# Patient Record
Sex: FEMALE | Race: WHITE | NOT HISPANIC OR LATINO | Employment: OTHER | URBAN - METROPOLITAN AREA
[De-identification: names, ages, dates, MRNs, and addresses within clinical notes are randomized per-mention and may not be internally consistent; named-entity substitution may affect disease eponyms.]

---

## 2024-08-22 ENCOUNTER — MEDICAL CORRESPONDENCE (OUTPATIENT)
Dept: HEALTH INFORMATION MANAGEMENT | Facility: CLINIC | Age: 59
End: 2024-08-22
Payer: COMMERCIAL

## 2024-08-22 ENCOUNTER — TRANSCRIBE ORDERS (OUTPATIENT)
Dept: OTHER | Age: 59
End: 2024-08-22

## 2024-08-22 DIAGNOSIS — M19.012 OSTEOARTHRITIS OF GLENOHUMERAL JOINT, LEFT: Primary | ICD-10-CM

## 2024-09-19 NOTE — TELEPHONE ENCOUNTER
Action 9/19/2024   Action Taken 1) Called [facility] and asked images to be pushed in PACS      REASON FOR VISIT: Osteoarthritis of glenohumeral joint, left   DATE OF APPT: 9/20/2024   NOTES (FOR ALL VISITS) STATUS DETAILS   OFFICE NOTE from referring provider [facility] Nelson Cavazos MD 8/22/2024   OFFICE NOTE from other specialist [facility] Dana Gagnon APRN CNP 6/28/2024   MEDICATION LIST N/A    IMAGING  (FOR ALL VISITS)     XR [facility]  XR Shoulder left 6/15/2024   MRI (HEAD, NECK, SPINE) N/A    CT (HEAD, NECK, SPINE) N/A

## 2024-10-07 ENCOUNTER — PRE VISIT (OUTPATIENT)
Dept: ORTHOPEDICS | Facility: CLINIC | Age: 59
End: 2024-10-07

## 2025-05-05 ENCOUNTER — ANCILLARY PROCEDURE (OUTPATIENT)
Dept: CT IMAGING | Facility: CLINIC | Age: 60
End: 2025-05-05
Attending: ORTHOPAEDIC SURGERY
Payer: COMMERCIAL

## 2025-05-05 ENCOUNTER — OFFICE VISIT (OUTPATIENT)
Dept: ORTHOPEDICS | Facility: CLINIC | Age: 60
End: 2025-05-05
Payer: COMMERCIAL

## 2025-05-05 ENCOUNTER — TELEPHONE (OUTPATIENT)
Dept: ORTHOPEDICS | Facility: CLINIC | Age: 60
End: 2025-05-05

## 2025-05-05 VITALS — HEIGHT: 55 IN | BODY MASS INDEX: 28.93 KG/M2 | WEIGHT: 125 LBS

## 2025-05-05 DIAGNOSIS — M19.012 ARTHRITIS OF SHOULDER REGION, LEFT: Primary | ICD-10-CM

## 2025-05-05 DIAGNOSIS — M19.012 ARTHRITIS OF SHOULDER REGION, LEFT: ICD-10-CM

## 2025-05-05 PROCEDURE — 99204 OFFICE O/P NEW MOD 45 MIN: CPT | Mod: GC | Performed by: ORTHOPAEDIC SURGERY

## 2025-05-05 PROCEDURE — 73200 CT UPPER EXTREMITY W/O DYE: CPT | Mod: LT | Performed by: RADIOLOGY

## 2025-05-05 RX ORDER — NAPROXEN 250 MG/1
TABLET ORAL
COMMUNITY

## 2025-05-05 RX ORDER — UBROGEPANT 100 MG/1
TABLET ORAL
COMMUNITY
Start: 2024-07-11

## 2025-05-05 RX ORDER — HYDROXYZINE HYDROCHLORIDE 25 MG/1
TABLET, FILM COATED ORAL
COMMUNITY
Start: 2024-09-03

## 2025-05-05 RX ORDER — AMLODIPINE BESYLATE 5 MG/1
TABLET ORAL
COMMUNITY
Start: 2025-02-03

## 2025-05-05 RX ORDER — GABAPENTIN 300 MG/1
CAPSULE ORAL
COMMUNITY
Start: 2025-02-03

## 2025-05-05 RX ORDER — LOSARTAN POTASSIUM 100 MG/1
TABLET ORAL
COMMUNITY

## 2025-05-05 RX ORDER — CYCLOBENZAPRINE HCL 10 MG
TABLET ORAL
COMMUNITY
Start: 2025-01-20

## 2025-05-05 RX ORDER — OMEPRAZOLE 20 MG/1
20 CAPSULE, DELAYED RELEASE ORAL
COMMUNITY

## 2025-05-05 RX ORDER — FLUTICASONE PROPIONATE 50 MCG
SPRAY, SUSPENSION (ML) NASAL
COMMUNITY
Start: 2025-02-26

## 2025-05-05 NOTE — LETTER
5/5/2025      Rosalie Fischer  [address]  [place] [address]      Dear Colleague,    Thank you for referring your patient, Rosalie Fischer, to the [facility]. Please see a copy of my visit note below.    CHIEF CONCERN: Left shoulder pain    HISTORY OF PRESENT ILLNESS:    Rosalie Fischer is a RHD 59 year old female who has left shoulder pain. She notes chronic shoulder pain which is getting worse. She localizes the pain to the anterior aspect of her shoulder as well as deep inside the glenohumeral joint. Her symptoms affect her daily activities and occasionally disturbs her sleep. She does not smoke cigarettes. Past medical history significant for achondroplasia, lumbar stenosis status post L4-S1 interbody fusion, foraminal stenosis of cervical spine. She has not tried PT or corticosteroid injections. She saw Dr. Sandhu at [facility] who referred her here for further management.    No past medical history on file.    No past surgical history on file.    Current Outpatient Medications   Medication Sig Dispense Refill     amLODIPine (NORVASC) 5 MG tablet Take 1 tablet every day by oral route at bedtime.       cyclobenzaprine (FLEXERIL) 10 MG tablet Take 1 Tablet (10 mg) by mouth if needed in themorning, at noon, and before bedtime for musclespasms.       fluticasone (FLONASE) 50 MCG/ACT nasal spray Inhale 2 Sprays into each nostril in the morning.       gabapentin (NEURONTIN) 300 MG capsule Take 2 capsules every day by oral route at bedtime.       hydrOXYzine HCl (ATARAX) 25 MG tablet Take 1 Tablet (25 mg) by mouth every 6 hours ifneeded for anxiety.       UBRELVY 100 MG tablet Take 1 Tablet (100 mg) by mouth as directed. Take 1tablet at onset of headache; may repeat in 2 hours asneeded.       losartan (COZAAR) 100 MG tablet Take 1 tablet every day by oral route at bedtime.       naproxen (NAPROSYN) 250 MG tablet Take 1 Tablet (250 mg) by mouth if needed in themorning and before  bedtime (pain)       omeprazole (PRILOSEC) 20 MG DR capsule Take 20 mg by mouth.            Allergies   Allergen Reactions     Prednisone Unknown and Other (See Comments)     Irritability, jittery    prednisone       SOCIAL HISTORY:    Social History     Socioeconomic History     Marital status: Single     Spouse name: Not on file     Number of children: Not on file     Years of education: Not on file     Highest education level: Not on file   Occupational History     Not on file   Tobacco Use     Smoking status: Not on file     Smokeless tobacco: Not on file   Substance and Sexual Activity     Alcohol use: Not on file     Drug use: Not on file     Sexual activity: Not on file   Other Topics Concern     Not on file   Social History Narrative     Not on file     Social Drivers of Health     Financial Resource Strain: Low Risk  (6/7/2024)    [facility]    Overall Financial Resource Strain (CARDIA)      Difficulty of Paying Living Expenses: Not very hard   Food Insecurity: No Food Insecurity (12/9/2024)    [email or web address]    Hunger Vital Sign      Worried About Running Out of Food in the Last Year: Never true      Ran Out of Food in the Last Year: Never true   Transportation Needs: No Transportation Needs (1/21/2025)    [facility]    OASIS : Transportation      Lack of Transportation (Medical): No      Lack of Transportation (Non-Medical): No      Patient Unable or Declines to Respond: No   Recent Concern: Transportation Needs - Unmet Transportation Needs (12/9/2024)    [facility]    Transportation Needs      In the past 12 months, has lack of transportation kept you from medical appointments, meetings, work, or from getting medicines or things needed for daily living?: Yes   Physical Activity: Inactive (6/7/2024)    [facility]    Exercise  Vital Sign      Days of Exercise per Week: 0 days      Minutes of Exercise per Session: 10 min   Stress: No Stress Concern Present (6/7/2024)    Received from [facility]    [employer] - Occupational Stress Questionnaire      Feeling of Stress : Only a little   Social Connections: Feeling Socially Integrated (1/21/2025)    Received from [facility]    OASIS : Social Isolation      Frequency of experiencing loneliness or isolation: Rarely   Interpersonal Safety: Not At Risk (1/31/2025)    Received from [facility]    Intimate Partner Violence      Are you in a relationship where you are physically hurt, threatened and/or made to feel afraid?: No   Housing Stability: Low Risk  (12/9/2024)    Received from [facility]    Housing Stability Vital Sign      Unable to Pay for Housing in the Last Year: No      Number of Times Moved in the Last Year: 0      Homeless in the Last Year: No       FAMILY HISTORY: Reviewed in EMR      REVIEW OF SYSTEMS: Positive for that noted in past medical history and history of present illness and otherwise reviewed in EMR     PHYSICAL EXAM:    Adult female in no acute distress. Articulates and communicates with normal affect.  Respirations even and unlabored  Focused upper extremity exam: Skin intact. No erythema. Sensation intact all dermatomes into the hand to light touch. EPL, FPL, and Intrinsics intact. Right shoulder active motion is FE to 130, ER at side to 40, and IR to L4. Left shoulder active motion is FE to 80, ER to 10, and IR to back pocket.  Positive Neer and Rice. No pain on palpation over the AC joint. Mild pain on palpation over the long head of the biceps.       IMAGING: (6/15/2024)  IMPRESSION:   1. Severe degenerative glenohumeral joint.   2. Rotator cuff calcific tendinosis.     ASSESSMENT:    Glenohumeral osteoarthritis, Left shoulder    PLAN:  We  discussed the finding of advanced shoulder arthritis. We discussed the non-operative and operative treatment options including the risks and potential benefits of each. These include activity modifications, cortisone injections, and PT for non-operative management, and total shoulder arthroplasty vs reverse shoulder arthroplasty as the operative treatment option. We discussed the expectations for pain relief and/or motion improvement with each option. She does not feel that non-operative management thus far has adequately improved symptoms and pain is very limiting at this time. She would like to proceed with surgical treatment.   We discussed getting a CT scan of the shoulder to plan for the surgery and decide between total shoulder vs reverse shoulder arthroplasty.   Patient agrees with the plan and wants to proceed with getting CT.    Gabrielle Mar MD  Fellow    I have personally examined this patient and have reviewed the clinical presentation and progress note with the fellow.  I agree with the treatment plan as outlined.  The plan was formulated with the fellow on the day of the fellow's note.     Ivon Garcia MD      Again, thank you for allowing me to participate in the care of your patient.        Sincerely,      Ivon Garcia MD    Electronically signed

## 2025-05-05 NOTE — NURSING NOTE
"Reason For Visit:   Chief Complaint   Patient presents with    Consult For     Left shoulder pain       PCP: No Ref-Primary, Physician  Ref: Dr. Sandhu    ?  No  Occupation Retired.  Currently working? No.  Work status?  Retired.  Date of injury: Gradual onset  Type of injury: No injury.  Date of surgery: No previous shoulder surgeries; no cortisone injections  Smoker: No  Request smoking cessation information: No    Right hand dominant    SANE score  Affected shoulder: Left  Right shoulder SANE: 90  Left shoulder SANE: 80    Ht 1.27 m (4' 2\")   Wt 56.7 kg (125 lb)   BMI 35.15 kg/m      Ghislaine Elmore ATC  "

## 2025-05-05 NOTE — PROGRESS NOTES
CHIEF CONCERN: Left shoulder pain    HISTORY OF PRESENT ILLNESS:    Rosalie Fischer is a RHD 59 year old female who has left shoulder pain. She notes chronic shoulder pain which is getting worse. She localizes the pain to the anterior aspect of her shoulder as well as deep inside the glenohumeral joint. Her symptoms affect her daily activities and occasionally disturbs her sleep. She does not smoke cigarettes. Past medical history significant for achondroplasia, lumbar stenosis status post L4-S1 interbody fusion, foraminal stenosis of cervical spine. She has not tried PT or corticosteroid injections. She saw Dr. Sandhu at [facility] who referred her here for further management.    No past medical history on file.    No past surgical history on file.    Current Outpatient Medications   Medication Sig Dispense Refill    amLODIPine (NORVASC) 5 MG tablet Take 1 tablet every day by oral route at bedtime.      cyclobenzaprine (FLEXERIL) 10 MG tablet Take 1 Tablet (10 mg) by mouth if needed in themorning, at noon, and before bedtime for musclespasms.      fluticasone (FLONASE) 50 MCG/ACT nasal spray Inhale 2 Sprays into each nostril in the morning.      gabapentin (NEURONTIN) 300 MG capsule Take 2 capsules every day by oral route at bedtime.      hydrOXYzine HCl (ATARAX) 25 MG tablet Take 1 Tablet (25 mg) by mouth every 6 hours ifneeded for anxiety.      UBRELVY 100 MG tablet Take 1 Tablet (100 mg) by mouth as directed. Take 1tablet at onset of headache; may repeat in 2 hours asneeded.      losartan (COZAAR) 100 MG tablet Take 1 tablet every day by oral route at bedtime.      naproxen (NAPROSYN) 250 MG tablet Take 1 Tablet (250 mg) by mouth if needed in themorning and before bedtime (pain)      omeprazole (PRILOSEC) 20 MG DR capsule Take 20 mg by mouth.            Allergies   Allergen Reactions    Prednisone Unknown and Other (See Comments)     Irritability, jittery    prednisone       SOCIAL HISTORY:    Social History      Socioeconomic History    Marital status: Single     Spouse name: Not on file    Number of children: Not on file    Years of education: Not on file    Highest education level: Not on file   Occupational History    Not on file   Tobacco Use    Smoking status: Not on file    Smokeless tobacco: Not on file   Substance and Sexual Activity    Alcohol use: Not on file    Drug use: Not on file    Sexual activity: Not on file   Other Topics Concern    Not on file   Social History Narrative    Not on file     Social Drivers of Health     Financial Resource Strain: Low Risk  (6/7/2024)    [facility]    Overall Financial Resource Strain (CARDIA)     Difficulty of Paying Living Expenses: Not very hard   Food Insecurity: No Food Insecurity (12/9/2024)    Received from [facility]    Hunger Vital Sign     Worried About Running Out of Food in the Last Year: Never true     Ran Out of Food in the Last Year: Never true   Transportation Needs: No Transportation Needs (1/21/2025)    Received from [facility]    OASIS : Transportation     Lack of Transportation (Medical): No     Lack of Transportation (Non-Medical): No     Patient Unable or Declines to Respond: No   Recent Concern: Transportation Needs - Unmet Transportation Needs (12/9/2024)    [facility]    Transportation Needs     In the past 12 months, has lack of transportation kept you from medical appointments, meetings, work, or from getting medicines or things needed for daily living?: Yes   Physical Activity: Inactive (6/7/2024)    [facility]    Exercise Vital Sign     Days of Exercise per Week: 0 days     Minutes of Exercise per Session: 10 min   Stress: No Stress Concern Present (6/7/2024)    [facility]    [employer] - Occupational Stress Questionnaire      Feeling of Stress : Only a little   Social Connections: Feeling Socially Integrated (1/21/2025)    Received from [facility]    OASIS : Social Isolation     Frequency of experiencing loneliness or isolation: Rarely   Interpersonal Safety: Not At Risk (1/31/2025)    [facility]    Intimate Partner Violence     Are you in a relationship where you are physically hurt, threatened and/or made to feel afraid?: No   Housing Stability: Low Risk  (12/9/2024)    [facility]    Housing Stability Vital Sign     Unable to Pay for Housing in the Last Year: No     Number of Times Moved in the Last Year: 0     Homeless in the Last Year: No       FAMILY HISTORY: Reviewed in EMR      REVIEW OF SYSTEMS: Positive for that noted in past medical history and history of present illness and otherwise reviewed in EMR     PHYSICAL EXAM:    Adult female in no acute distress. Articulates and communicates with normal affect.  Respirations even and unlabored  Focused upper extremity exam: Skin intact. No erythema. Sensation intact all dermatomes into the hand to light touch. EPL, FPL, and Intrinsics intact. Right shoulder active motion is FE to 130, ER at side to 40, and IR to L4. Left shoulder active motion is FE to 80, ER to 10, and IR to back pocket.  Positive Neer and Rice. No pain on palpation over the AC joint. Mild pain on palpation over the long head of the biceps.       IMAGING: (6/15/2024)  IMPRESSION:   1. Severe degenerative glenohumeral joint.   2. Rotator cuff calcific tendinosis.     ASSESSMENT:    Glenohumeral osteoarthritis, Left shoulder    PLAN:  We discussed the finding of advanced shoulder arthritis. We discussed the non-operative and operative treatment options including the risks and potential benefits of each. These include activity modifications, cortisone injections, and PT for non-operative management, and total shoulder  arthroplasty vs reverse shoulder arthroplasty as the operative treatment option. We discussed the expectations for pain relief and/or motion improvement with each option. She does not feel that non-operative management thus far has adequately improved symptoms and pain is very limiting at this time. She would like to proceed with surgical treatment.   We discussed getting a CT scan of the shoulder to plan for the surgery and decide between total shoulder vs reverse shoulder arthroplasty.   Patient agrees with the plan and wants to proceed with getting CT.    Ivon Garcia MD

## 2025-05-05 NOTE — TELEPHONE ENCOUNTER
CT done and sent to both [facility] and [facility] for planning.    Dr. Garcia to coordinate surgery with Dr. Sandhu at [facility].    Procedure: Left total shoulder arthroplasty  Facility: [place] or [facility] with Dr. Sandhu  Length: ? minutes  Anesthesia: Choice, Interscalene Block  Post-op appointments needed: 2 weeks with surgeon or PA, 6 weeks with surgeon only.  Surgery packet/instructions given to patient?  No    To aCba, RNCC